# Patient Record
Sex: MALE | Race: WHITE
[De-identification: names, ages, dates, MRNs, and addresses within clinical notes are randomized per-mention and may not be internally consistent; named-entity substitution may affect disease eponyms.]

---

## 2018-10-07 ENCOUNTER — HOSPITAL ENCOUNTER (EMERGENCY)
Dept: HOSPITAL 80 - FED | Age: 78
Discharge: HOME | End: 2018-10-07
Payer: COMMERCIAL

## 2018-10-07 VITALS — DIASTOLIC BLOOD PRESSURE: 75 MMHG | SYSTOLIC BLOOD PRESSURE: 158 MMHG

## 2018-10-07 DIAGNOSIS — N40.1: ICD-10-CM

## 2018-10-07 DIAGNOSIS — R33.9: Primary | ICD-10-CM

## 2018-10-07 PROCEDURE — 0T9B70Z DRAINAGE OF BLADDER WITH DRAINAGE DEVICE, VIA NATURAL OR ARTIFICIAL OPENING: ICD-10-PCS | Performed by: EMERGENCY MEDICINE

## 2018-10-07 NOTE — EDPHY
H & P


Stated Complaint: Anuria


Time Seen by Provider: 10/07/18 08:13


HPI/ROS: 





CHIEF COMPLAINT: Unable to urinate





HISTORY OF PRESENT ILLNESS: The patient is a 78 y/o male with a history of 

asthma, rheumatoid arthritis, and enlarged prostate visiting from [place] 

who complains of inability to urinate since 22:30 last night, about 10 hours 

ago. He has never experienced this issue before. Prior to catheter placement 

here, he had significant discomfort and suprapubic abdominal pain. No history 

of recent illness or trauma. He denies history of diabetes, cardiac disease.





REVIEW OF SYSTEMS:


A ten system review of systems was performed and is negative with the exception 

of the items mentioned in the HPI.





Past medical history: Enlarged prostate; asthma; rheumatoid arthritis - NSAIDs, 

methotrexate





Past surgical history: Prostate biopsy, double hernia repair





Family history: Noncontributory





Social history: Lives in [place], flying home this afternoon. Retired [employer] 

. Former smoker (age 11-30s). , wife recently had hip 

surgery. 





General Appearance:  Alert.  Vital signs reviewed.  Blood pressure 173/109, 

heart rate 125 at triage.


Neck:  No lymphadenopathy, supple.


Respiratory:  Lungs are clear to auscultation; no wheezes, rales, or rhonchi.


Cardiovascular:  Regular rate and rhythm; no murmur, rub, or gallop.


Gastrointestinal:  Abdomen is soft with suprapubic tenderness and some 

suprapubic distension.  No guarding or rebound.


Skin:  Warm and dry, no rashes on exposed skin, normal color.


Back:  Nontender to palpation over the thoracolumbar spine. No CVAT.


Extremities:  No lower extremity edema, no calf tenderness or swelling.


Neurological:  Alert and oriented.  Moving all four extremities easily and 

equally.


Psychiatric:  Normal affect.





- Personal History


Current Tetanus/Diphtheria Vaccine: Yes





- Medical/Surgical History


Hx Asthma: No


Hx Chronic Respiratory Disease: No


Hx Diabetes: No


Hx Cardiac Disease: No


Hx Renal Disease: No


Hx Cirrhosis: No


Hx Alcoholism: No


Other PMH: BPH





- Social History


Smoking Status: Never smoked


Constitutional: 


 Initial Vital Signs











Temperature (C)  36.4 C   10/07/18 08:01


 


Heart Rate  125 H  10/07/18 08:01


 


Respiratory Rate  18   10/07/18 08:01


 


Blood Pressure  173/109 H  10/07/18 08:01


 


O2 Sat (%)  96   10/07/18 08:01








 











O2 Delivery Mode               Room Air














Allergies/Adverse Reactions: 


 





cat dander Allergy (Verified 10/07/18 08:05)


 


dog dander Allergy (Verified 10/07/18 08:05)


 


horse dander Allergy (Verified 10/07/18 08:05)


 











Medical Decision Making


ED Course/Re-evaluation: 


This is a 78 y/o male presenting with a 10-hour history of acute urinary 

retention. He is uncomfortable and has suprapubic tenderness on exam. 900cc 

urine seen on bladder scan. Plan for Hinkle placement. 





0850: Reassessed patient. 700cc urine in bag so far. He is feeling relieved. 





0900: 1200cc urine in bag. 





Patient is feeling significantly better. Abdomen is benign. Heart rate has 

improved. He will be discharged home with Hinkle catheter in place and 

recommendation to follow up with urology upon his return home to [place] today. 

Standard care instructions and return precautions discussed. 


Differential Diagnosis: 





Urinary retention including but not limited to medication side effect, 

neurologic causes, outflow obstruction including prostatic hypertrophy, and 

blood.





Departure





- Departure


Disposition: Home, Routine, Self-Care


Clinical Impression: 


 Urinary retention





Condition: Good


Instructions:  Urinary Retention in Men (ED), Hinkle Catheter Placement and Care 

(ED)


Additional Instructions: 


1. Keep Hinkle catheter in place until follow up with your urologist. 


2. Follow up with your urologist upon your return home, within 1-2 days. You 

may need to contact your [facility] physician first for a referral. 


3. Return to the nearest ED for severe pain, issues with catheter drainage, 

fever, or other worsening of condition. 


Referrals: 


Dago Albrecht MD [Medical Doctor] - As per Instructions


Report Scribed for: Lyric Urias


Report Scribed by: Cee Phillips


Date of Report: 10/07/18


Time of Report: 08:30


Physician Review and Approval Statement: 





10/07/18 08:14


Portions of this note were transcribed by the medical scribe.  I, Dr. Lyric Urias, personally performed the history, physical exam, and medical decision-

making; and confirmed the accuracy of the information in the transcribed note.